# Patient Record
Sex: FEMALE | Race: BLACK OR AFRICAN AMERICAN | Employment: STUDENT | URBAN - METROPOLITAN AREA
[De-identification: names, ages, dates, MRNs, and addresses within clinical notes are randomized per-mention and may not be internally consistent; named-entity substitution may affect disease eponyms.]

---

## 2017-06-01 ENCOUNTER — HOSPITAL ENCOUNTER (OUTPATIENT)
Age: 22
Discharge: HOME | End: 2017-06-01
Attending: FAMILY MEDICINE
Payer: COMMERCIAL

## 2017-06-01 VITALS
DIASTOLIC BLOOD PRESSURE: 71 MMHG | HEIGHT: 64 IN | OXYGEN SATURATION: 98 % | WEIGHT: 185 LBS | RESPIRATION RATE: 18 BRPM | SYSTOLIC BLOOD PRESSURE: 114 MMHG | HEART RATE: 85 BPM | BODY MASS INDEX: 31.58 KG/M2 | TEMPERATURE: 99 F

## 2017-06-01 DIAGNOSIS — J02.9 ACUTE PHARYNGITIS, UNSPECIFIED ETIOLOGY: Primary | ICD-10-CM

## 2017-06-01 PROCEDURE — 87081 CULTURE SCREEN ONLY: CPT | Performed by: FAMILY MEDICINE

## 2017-06-01 PROCEDURE — 99214 OFFICE O/P EST MOD 30 MIN: CPT

## 2017-06-01 PROCEDURE — 87430 STREP A AG IA: CPT | Performed by: FAMILY MEDICINE

## 2017-06-01 PROCEDURE — 99203 OFFICE O/P NEW LOW 30 MIN: CPT

## 2017-06-01 RX ORDER — ALBUTEROL SULFATE 90 UG/1
AEROSOL, METERED RESPIRATORY (INHALATION) EVERY 6 HOURS PRN
COMMUNITY

## 2017-06-01 NOTE — ED INITIAL ASSESSMENT (HPI)
Pt c/o sore throat & slight fever \"pt reports she felt hot\" x 4 days. Denies any other s/s, reports her mom was dx with strep throat 1 week ago.

## 2017-06-02 NOTE — ED PROVIDER NOTES
Patient presents with:  Sore Throat      HPI:     Ken Camejo is a 25year old female who presents for evaluation of a chief complaint of sore throat. Associated symptoms include fever. Onset of symptoms was abrupt starting 4 days ago.  Symptoms have All results reviewed and discussed with patient. See AVS for detailed discharge instructions for your condition today.     Follow Up with:  pcp    In 1 week

## 2017-07-09 ENCOUNTER — HOSPITAL ENCOUNTER (OUTPATIENT)
Age: 22
Discharge: HOME | End: 2017-07-09
Attending: FAMILY MEDICINE
Payer: COMMERCIAL

## 2017-07-09 ENCOUNTER — APPOINTMENT (OUTPATIENT)
Dept: GENERAL RADIOLOGY | Age: 22
End: 2017-07-09
Attending: FAMILY MEDICINE
Payer: COMMERCIAL

## 2017-07-09 VITALS
OXYGEN SATURATION: 99 % | HEART RATE: 93 BPM | TEMPERATURE: 100 F | RESPIRATION RATE: 16 BRPM | SYSTOLIC BLOOD PRESSURE: 102 MMHG | HEIGHT: 64 IN | WEIGHT: 180 LBS | DIASTOLIC BLOOD PRESSURE: 70 MMHG | BODY MASS INDEX: 30.73 KG/M2

## 2017-07-09 DIAGNOSIS — J45.901 ASTHMA EXACERBATION, MILD: Primary | ICD-10-CM

## 2017-07-09 PROCEDURE — 99214 OFFICE O/P EST MOD 30 MIN: CPT

## 2017-07-09 PROCEDURE — 94640 AIRWAY INHALATION TREATMENT: CPT

## 2017-07-09 PROCEDURE — 71020 XR CHEST PA + LAT CHEST (CPT=71020): CPT | Performed by: FAMILY MEDICINE

## 2017-07-09 RX ORDER — PREDNISOLONE SODIUM PHOSPHATE 15 MG/5ML
60 SOLUTION ORAL DAILY
Qty: 60 ML | Refills: 0 | Status: SHIPPED | OUTPATIENT
Start: 2017-07-09 | End: 2017-07-12

## 2017-07-09 RX ORDER — IPRATROPIUM BROMIDE AND ALBUTEROL SULFATE 2.5; .5 MG/3ML; MG/3ML
3 SOLUTION RESPIRATORY (INHALATION) ONCE
Status: COMPLETED | OUTPATIENT
Start: 2017-07-09 | End: 2017-07-09

## 2017-07-09 NOTE — ED INITIAL ASSESSMENT (HPI)
The patient is here with complaints of an asthma attack. She states it started last night. She last took the inhaler at 3am and symbicort around 1215pm.  She states she still feels chest tightness and if she takes a deep breath her lungs hurt.

## 2017-07-09 NOTE — ED PROVIDER NOTES
Patient Seen in: [facility]    History   Patient presents with:  Dyspnea TRACY SOB (respiratory)    Stated Complaint: asthma attack    HPI    History of asthma.  Last night she felt like she was having an asthma attack at arou Exam    GENERAL: Alert, no distress  HEENT: NC/AT, PERRL, EOMI, conjunctiva clear, TMs clear, nasal mucosa with mild erythema, oropharynx clear  NECK: Supple, no lymphadenopathy   HEART: Regular rate and rhythm   LUNGS: Clear to auscultation bilaterally, d these medications    PrednisoLONE Sodium Phosphate (ORAPRED) 3 MG/ML Oral Solution  Take 20 mL (60 mg total) by mouth daily. , Normal, Disp-60 mL, R-0

## 2020-12-20 NOTE — ED PROVIDER NOTES
Patient Seen in: [facility] Emergency Department      History   Patient presents with:   Anxiety/Panic attack  Shortness Of Breath    Stated Complaint: axiety, SOB    HPI    74-year-old woman with a history of anxiety disorder and occasional panic at rhythm   Abdomen: Soft and nondistended. Extremities: no edema, normal peripheral pulses   Neuro: Alert oriented and nonfocal.  No pronator drift. No difficulty with ambulation.           ED Course     Labs Reviewed   BASIC METABOLIC PANEL (8) - Abnormal; Medication List

## (undated) NOTE — ED AVS SNAPSHOT
[facility]    [address] [place] [address]    Phone:  [phone]    Fax:  [phone]           Jose Roberto Malloy   MRN: [id number]    Department:  THE [facility]   Date of Visit self-assessment the day after your visit. You may also receive a call from our patient liason soon after your visit. Also, some patients receive a detailed feedback survey mailed to them a week after the visit.   If you receive this, we would really apprec [facility] [phone] [address] [facility]45 [address] ([address]) [facility] [address] [facility].  (Cat If you have questions, you can call [phone] to talk to our [facility] Staff. Remember, [employer] is NOT to be used for urgent needs. For medical emergencies, dial 911.